# Patient Record
Sex: FEMALE | Race: OTHER | Employment: UNEMPLOYED | URBAN - METROPOLITAN AREA
[De-identification: names, ages, dates, MRNs, and addresses within clinical notes are randomized per-mention and may not be internally consistent; named-entity substitution may affect disease eponyms.]

---

## 2024-06-04 ENCOUNTER — OFFICE VISIT (OUTPATIENT)
Dept: FAMILY MEDICINE CLINIC | Age: 49
End: 2024-06-04

## 2024-06-04 VITALS
HEART RATE: 78 BPM | TEMPERATURE: 97.5 F | HEIGHT: 66 IN | SYSTOLIC BLOOD PRESSURE: 200 MMHG | DIASTOLIC BLOOD PRESSURE: 100 MMHG | OXYGEN SATURATION: 99 % | RESPIRATION RATE: 16 BRPM | WEIGHT: 293 LBS | BODY MASS INDEX: 47.09 KG/M2

## 2024-06-04 DIAGNOSIS — I10 UNCONTROLLED HYPERTENSION: Primary | ICD-10-CM

## 2024-06-04 DIAGNOSIS — E66.01 MORBID OBESITY (HCC): ICD-10-CM

## 2024-06-04 DIAGNOSIS — Z59.89 UNINSURED: ICD-10-CM

## 2024-06-04 PROCEDURE — 3077F SYST BP >= 140 MM HG: CPT | Performed by: NURSE PRACTITIONER

## 2024-06-04 PROCEDURE — 3080F DIAST BP >= 90 MM HG: CPT | Performed by: NURSE PRACTITIONER

## 2024-06-04 PROCEDURE — 99203 OFFICE O/P NEW LOW 30 MIN: CPT | Performed by: NURSE PRACTITIONER

## 2024-06-04 RX ORDER — CYCLOBENZAPRINE HCL 5 MG
5 TABLET ORAL EVERY 8 HOURS
COMMUNITY
Start: 2024-05-23

## 2024-06-04 RX ORDER — LOSARTAN POTASSIUM 50 MG/1
50 TABLET ORAL DAILY
Qty: 30 TABLET | Refills: 0 | Status: SHIPPED | OUTPATIENT
Start: 2024-06-04

## 2024-06-04 RX ORDER — AMLODIPINE BESYLATE 10 MG/1
10 TABLET ORAL DAILY
Qty: 30 TABLET | Refills: 0 | Status: SHIPPED | OUTPATIENT
Start: 2024-06-04

## 2024-06-04 RX ORDER — AMLODIPINE BESYLATE 10 MG/1
10 TABLET ORAL DAILY
Qty: 30 TABLET | Refills: 0 | Status: SHIPPED | OUTPATIENT
Start: 2024-06-04 | End: 2024-06-04 | Stop reason: SDUPTHER

## 2024-06-04 SDOH — ECONOMIC STABILITY - INCOME SECURITY: OTHER PROBLEMS RELATED TO HOUSING AND ECONOMIC CIRCUMSTANCES: Z59.89

## 2024-06-04 ASSESSMENT — LIFESTYLE VARIABLES
HOW OFTEN DO YOU HAVE A DRINK CONTAINING ALCOHOL: MONTHLY OR LESS
HOW MANY STANDARD DRINKS CONTAINING ALCOHOL DO YOU HAVE ON A TYPICAL DAY: 1 OR 2

## 2024-06-04 NOTE — PROGRESS NOTES
Eve Mosley (:  1975) is a 48 y.o. female,New patient, here for evaluation of the following chief complaint(s):  Hypertension (Patient seen for blood pressure medication change. She said was at the [facility] ED on 24 and lisinopril 10 mg was ordered. She said the named medication is giving her cough and she has stopped taking. She also said was referred to [facility], she is yet to follow up.)        SUBJECTIVE/OBJECTIVE:    HPI:  The 48 y.o new female patient seen in [facility] today at [address] for blood pressure medication concern. Patient was seen at [facility] ED on 24 for hypertension and left side neck pain. While she was seen at emergency department was treated with diazepam 5 mg, Ketorolac 15 mg, Tylenol 1000 mg, and Lidocaine 4% topical  1 patch.   She was discharged with Flexeril 5 mg, and lisinopril 10 mg.  Patient was previously on antihypertensives who has not been taking for several months. At hospital visit she was discharged with Lisinopril 10 mg she has not been taking. Rather reported the named medication gives her cough.    At this visit. Lisinopril 10 mg has been D/C.  Started patient with Amlodipine 10 mg for 30 days until she seen her PCP. Also added Losartan 50 mg to take in the morning, until she sees her PCP.    Patient who arrived /127, rechecked 200/100 is aware that uncontrolled BP could damage the organs may result in stroke.  Patient encouraged to take her medications as indicated.     Keep your PCP appointment is very important. Your medications are for 30 days only. Go with all your medications to PCP appointment.    Daily walking as tolerated help to reduce weight as well as blood pressure.     BP (!) 200/100   Pulse 78   Temp 97.5 °F (36.4 °C) (Temporal)   Resp 16   Ht 1.68 m (5' 6.14\")   Wt 133 kg (293 lb 3.4 oz)   SpO2 99%   BMI 47.12 kg/m²     No results found for: \"CBC\", \"CMP\", \"LIPIDPAN\", \"TSH\", \"HBA1C\"     No

## 2024-06-05 ASSESSMENT — ENCOUNTER SYMPTOMS
SHORTNESS OF BREATH: 0
CHEST TIGHTNESS: 0
FACIAL SWELLING: 0